# Patient Record
Sex: FEMALE | Race: WHITE | NOT HISPANIC OR LATINO | URBAN - METROPOLITAN AREA
[De-identification: names, ages, dates, MRNs, and addresses within clinical notes are randomized per-mention and may not be internally consistent; named-entity substitution may affect disease eponyms.]

---

## 2023-06-08 ENCOUNTER — OFFICE (OUTPATIENT)
Dept: URBAN - METROPOLITAN AREA CLINIC 72 | Facility: CLINIC | Age: 73
End: 2023-06-08
Payer: COMMERCIAL

## 2023-06-08 VITALS
OXYGEN SATURATION: 99 % | HEART RATE: 74 BPM | DIASTOLIC BLOOD PRESSURE: 72 MMHG | SYSTOLIC BLOOD PRESSURE: 122 MMHG | HEIGHT: 65 IN | WEIGHT: 205 LBS

## 2023-06-08 DIAGNOSIS — Z83.71 FAMILY HISTORY OF COLONIC POLYPS: ICD-10-CM

## 2023-06-08 DIAGNOSIS — Z85.3 PERSONAL HISTORY OF MALIGNANT NEOPLASM OF BREAST: ICD-10-CM

## 2023-06-08 DIAGNOSIS — Z86.010 PERSONAL HISTORY OF COLONIC POLYPS: ICD-10-CM

## 2023-06-08 DIAGNOSIS — I10 ESSENTIAL (PRIMARY) HYPERTENSION: ICD-10-CM

## 2023-06-08 PROCEDURE — 99203 OFFICE O/P NEW LOW 30 MIN: CPT | Performed by: NURSE PRACTITIONER

## 2023-06-08 RX ORDER — POLYETHYLENE GLYCOL 3350, SODIUM SULFATE, SODIUM CHLORIDE, POTASSIUM CHLORIDE, ASCORBIC ACID, SODIUM ASCORBATE 140-9-5.2G
KIT ORAL
Qty: 1 | Refills: 0 | Status: ACTIVE
Start: 2023-06-08

## 2023-06-08 NOTE — SERVICEHPINOTES
Ms. Finn is here today for a Colonoscopy Consultation. Her last Colonoscopy was in 2011 with Dr. Reyes and a 3 year follow up was recommended at that time. br
br Since we saw her last she was diagnosed with Breast Cancer  in 4/2021. She completed radiation 12/2022 and Chemo completed last year. 
br
br She has no GI complaints today. She reports normal bowel pattern with a soft, stool once a day and no blood or mucus in stool. 
br
brShe denies any nausea, vomiting, reflux, regurgitation, dysphagia, abdominal pain, constipation, diarrhea, blood or mucus in stool, significant weight loss, change in appetite, fever, chills or fatigue.span id="{[id number]}" class="narrative freetextSelected" type="freetext" canedit="true" suppressed="false" nid="[id number]" gid="{[id number]}" bound="false" visited="true"Procedures br9/16/2011 Colonoscopy with Dr. Reyes- polyps (adenomatous) are benign but need f/u colon 3 years br7/17/2006 Colonoscopy with Dr. Reyes- normal, repeat in 3 years br4/24/2006 Colonoscopy with Dr[address] Reyes- villous adenomas, large villous adenoma, repeat in 6-12 months/spanspan id="{[id number]}" class="narrative placeholderNormal" type="placeholder" canedit="true" suppressed="true" text="[ROS Wording]" nid="[id number]" gid="{[id number]}" propertyname="rosWording" displayname="ROS Wording" tooltip="" handler="" handlerdata="" datatype="text" mandatory="false" requires="" allowmultipleentries="" describes="" tagname="" prototype="" dontshowempty="false" empty="true" onmouseover="__NarrativeOnMouseOver('{[id number]}')" onmouseout="__NarrativeOnMouseOut('{[id number]}')" onmousedown="__NarrativePlaceholderClicked('{[id number]}')"/spanspan id="{[id number]}" class="narrative freetextNormal" type="freetext" canedit="true" suppressed="false" nid="[id number]" gid="{[id number]}" bound="false" /span

## 2023-06-08 NOTE — SERVICENOTES
- Please call us if you have not received your prep at least 2 weeks prior to colonoscopy or if you have any questions about your prep.
- Will schedule on a Tuesday or Thursday when August schedule opens